# Patient Record
Sex: FEMALE | Race: OTHER | Employment: OTHER | URBAN - METROPOLITAN AREA
[De-identification: names, ages, dates, MRNs, and addresses within clinical notes are randomized per-mention and may not be internally consistent; named-entity substitution may affect disease eponyms.]

---

## 2022-03-07 ASSESSMENT — VISUAL ACUITY
OS_BAT: 20/40
OD_BAT: 20/50

## 2022-03-09 ENCOUNTER — CONSULTATION/EVALUATION (OUTPATIENT)
Dept: URBAN - METROPOLITAN AREA CLINIC 39 | Facility: CLINIC | Age: 62
End: 2022-03-09

## 2022-03-09 DIAGNOSIS — H26.493: ICD-10-CM

## 2022-03-09 PROCEDURE — 92014 COMPRE OPH EXAM EST PT 1/>: CPT

## 2022-12-01 NOTE — PATIENT DISCUSSION
Instructed patient to not look at lights when driving at night and to try to look around them. Patient given glasses rx to help with glare for driving at night and instructed to use tears before driving.